# Patient Record
Sex: MALE | URBAN - METROPOLITAN AREA
[De-identification: names, ages, dates, MRNs, and addresses within clinical notes are randomized per-mention and may not be internally consistent; named-entity substitution may affect disease eponyms.]

---

## 2024-09-24 ENCOUNTER — APPOINTMENT (RX ONLY)
Dept: URBAN - METROPOLITAN AREA CLINIC 183 | Facility: CLINIC | Age: 44
Setting detail: DERMATOLOGY
End: 2024-09-24

## 2024-09-24 VITALS — HEIGHT: 67 IN | WEIGHT: 160 LBS

## 2024-09-24 DIAGNOSIS — B35.4 TINEA CORPORIS: ICD-10-CM

## 2024-09-24 PROCEDURE — 87220 TISSUE EXAM FOR FUNGI: CPT

## 2024-09-24 PROCEDURE — ? COUNSELING

## 2024-09-24 PROCEDURE — 99203 OFFICE O/P NEW LOW 30 MIN: CPT

## 2024-09-24 PROCEDURE — ? PRESCRIPTION

## 2024-09-24 PROCEDURE — ? TREATMENT REGIMEN

## 2024-09-24 PROCEDURE — ? KOH PREP

## 2024-09-24 RX ORDER — TERBINAFINE HYDROCHLORIDE 1 G/100G
APPLY CREAM TOPICAL QD
Qty: 30 | Refills: 0 | Status: ERX | COMMUNITY
Start: 2024-09-24

## 2024-09-24 RX ADMIN — TERBINAFINE HYDROCHLORIDE APPLY: 1 CREAM TOPICAL at 00:00

## 2024-09-24 ASSESSMENT — LOCATION DETAILED DESCRIPTION DERM: LOCATION DETAILED: LEFT VENTRAL DISTAL FOREARM

## 2024-09-24 ASSESSMENT — LOCATION ZONE DERM: LOCATION ZONE: ARM

## 2024-09-24 ASSESSMENT — SEVERITY ASSESSMENT: SEVERITY: MILD

## 2024-09-24 ASSESSMENT — LOCATION SIMPLE DESCRIPTION DERM: LOCATION SIMPLE: LEFT FOREARM

## 2024-09-24 NOTE — PROCEDURE: KOH PREP
Detail Level: Zone
Cpt Desired: 27437
Showing: branching hyphae
Koh Intro Text (From The.....): A KOH prep was ordered and evaluated from the
Koh Procedure Text (Tissue Harvesting Technique): A 15-blade scalpel was used to scrape the skin. The skin scrapings were placed on a glass slide, covered with a coverslip and a KOH solution was applied.